# Patient Record
Sex: FEMALE | Race: WHITE | NOT HISPANIC OR LATINO | Employment: OTHER | URBAN - METROPOLITAN AREA
[De-identification: names, ages, dates, MRNs, and addresses within clinical notes are randomized per-mention and may not be internally consistent; named-entity substitution may affect disease eponyms.]

---

## 2024-03-08 ENCOUNTER — TELEPHONE (OUTPATIENT)
Dept: ORTHOPEDIC SURGERY | Facility: CLINIC | Age: 49
End: 2024-03-08
Payer: COMMERCIAL

## 2024-03-08 DIAGNOSIS — S83.401A SPRAIN OF COLLATERAL LIGAMENT OF RIGHT KNEE, INITIAL ENCOUNTER: Primary | ICD-10-CM

## 2024-03-11 RX ORDER — IBUPROFEN 800 MG/1
800 TABLET ORAL 3 TIMES DAILY
Qty: 90 TABLET | Refills: 0 | Status: SHIPPED | OUTPATIENT
Start: 2024-03-11 | End: 2024-04-10

## 2024-03-11 NOTE — TELEPHONE ENCOUNTER
.  
Quality 226: Preventive Care And Screening: Tobacco Use: Screening And Cessation Intervention: Patient screened for tobacco and is an ex-smoker
Quality 265: Biopsy Follow-Up: Biopsy results reviewed, communicated, tracked, and documented
Detail Level: Zone

## 2024-06-27 ENCOUNTER — APPOINTMENT (OUTPATIENT)
Dept: GASTROENTEROLOGY | Facility: CLINIC | Age: 49
End: 2024-06-27
Payer: COMMERCIAL

## 2024-06-27 DIAGNOSIS — Z80.0 FAMILY HISTORY OF COLON CANCER: Primary | ICD-10-CM

## 2024-06-27 PROBLEM — M70.60 TROCHANTERIC BURSITIS: Status: ACTIVE | Noted: 2024-06-27

## 2024-06-27 PROBLEM — N39.3 STRESS INCONTINENCE: Status: ACTIVE | Noted: 2021-02-26

## 2024-06-27 PROBLEM — F41.9 ANXIETY: Status: ACTIVE | Noted: 2018-10-12

## 2024-06-27 PROBLEM — F41.1 GAD (GENERALIZED ANXIETY DISORDER): Status: ACTIVE | Noted: 2023-05-17

## 2024-06-27 PROBLEM — S93.516A: Status: ACTIVE | Noted: 2023-05-17

## 2024-06-27 PROBLEM — S16.1XXA NECK STRAIN: Status: ACTIVE | Noted: 2024-06-27

## 2024-06-27 PROBLEM — J30.9 ALLERGIC RHINITIS: Status: ACTIVE | Noted: 2024-06-27

## 2024-06-27 PROBLEM — C44.310 BASAL CELL CARCINOMA (BCC) OF SKIN OF FACE: Status: ACTIVE | Noted: 2023-05-17

## 2024-06-27 PROBLEM — S99.929A PLANTAR PLATE INJURY: Status: ACTIVE | Noted: 2024-06-27

## 2024-06-27 PROBLEM — M26.609 TMJ DISEASE: Status: ACTIVE | Noted: 2024-06-27

## 2024-06-27 PROBLEM — M79.671 RIGHT FOOT PAIN: Status: ACTIVE | Noted: 2024-06-27

## 2024-06-27 PROBLEM — L73.9 FOLLICULITIS: Status: ACTIVE | Noted: 2024-06-27

## 2024-06-27 PROBLEM — R05.1 ACUTE COUGH: Status: ACTIVE | Noted: 2024-05-14

## 2024-06-27 PROBLEM — M79.672 LEFT FOOT PAIN: Status: ACTIVE | Noted: 2024-06-27

## 2024-06-27 PROBLEM — M51.24 THORACIC DISC HERNIATION: Status: ACTIVE | Noted: 2023-05-17

## 2024-06-27 PROCEDURE — 99202 OFFICE O/P NEW SF 15 MIN: CPT | Performed by: NURSE PRACTITIONER

## 2024-06-27 RX ORDER — SODIUM, POTASSIUM,MAG SULFATES 17.5-3.13G
SOLUTION, RECONSTITUTED, ORAL ORAL
Qty: 354 ML | Refills: 0 | Status: SHIPPED | OUTPATIENT
Start: 2024-06-27

## 2024-06-27 RX ORDER — IBUPROFEN 800 MG/1
800 TABLET ORAL EVERY 8 HOURS PRN
COMMUNITY
Start: 2024-03-08

## 2024-06-27 RX ORDER — DICLOFENAC POTASSIUM 50 MG/1
50 TABLET, FILM COATED ORAL AS NEEDED
COMMUNITY
Start: 2024-05-01

## 2024-06-27 NOTE — PROGRESS NOTES
"Subjective   Patient ID: Pennie Grimm is a 49 y.o. female who presents for Colon Cancer Screening.  HPI  Patient wanting to schedule a colonoscopy with Dr. Hill.  She has family history of colon cancer.  Her paternal grandmother has history of colon cancer.  Her father has history of precancerous polyps.  Patient denies melena, hematochezia or hematemesis.  No bowel habit changes or change in stool caliber.  No abdominal pain, weight loss or decreased appetite.  No GERD, nausea or vomiting.  No dysphagia.  No excessive NSAIDs, alcohol.  No smoking.  ->Colonoscopy 11/2019:  - Diverticulosis in the sigmoid colon.                         - The examination was otherwise normal.                         - No specimens collected.                         - Repeat colonoscopy in 5 years for surveillance.  Current Outpatient Medications on File Prior to Visit   Medication Sig Dispense Refill    diclofenac (Cataflam) 50 mg tablet Take 1 tablet (50 mg) by mouth if needed.      ibuprofen 800 mg tablet Take 1 tablet (800 mg) by mouth every 8 hours if needed.      levonorgestrel (Mirena) 21 mcg/24 hr (8 yrs) 52 mg IUD 52 mg by intrauterine route.       No current facility-administered medications on file prior to visit.        Review of Systems   All other systems reviewed and are negative.      Objective   Physical Exam  Neurological:      Mental Status: She is oriented to person, place, and time.      Comments: Patient is conversive and answering questions appropriately.  Telephone encounter 15 minutes, to review the chart, talk to the patient and document.       There were no vitals taken for this visit.     No results found for: \"WBC\", \"HGB\", \"HCT\", \"MCV\", \"PLT\"        No lab exists for component: \"LABALBU\"    No results found for: \"AFP\"  No results found for: \"TSH\"      Assessment/Plan   Diagnoses and all orders for this visit:  Family history of colon cancer  Very pleasant 49-year-old female establishing with GI at  " for surveillance colonoscopy with Dr. Hill.  Her last colonoscopy was in November, 2019 detailed in the HPI.  Patient's paternal grandmother has a history of colon cancer and her father has history of precancerous polyps.  It was recommended by Dr. Hill that she repeat her colonoscopy in 5 years for surveillance.  No red flag symptoms noted.  The procedure was discussed at length, including all possible risks.  Importance of the bowel prep was stressed to the patient for optimal results.  -     Colonoscopy Screening; High Risk Patient; Future  -     sodium,potassium,mag sulfates (Suprep) 17.5-3.13-1.6 gram recon soln solution; Take one bottle twice as directed by the prep instructions

## 2024-11-05 NOTE — PROGRESS NOTES
History: Pennie is here for her right knee and left elbow. She is an established patient last seen in 2023 for her right hip. She recently returned from her vacation in [facility]. She describes an occasional burning pain in her knee for the past 9 months. She feels that it bothers her more with activity. She notes that working out does not seem to make her knee hurt worse. Her elbow has been painful for the past month. It is aggravated with golf, tennis, and free weights. She denies any injury.       Past medical history: Multiple  Medications: Multiple  Allergies: No known drug allergies    Please refer to the intake H&P regarding the patient's review of systems, family history and social history as was done today    HEENT: Normal  Lungs: Clear to auscultation  Heart: RRR  Abdomen: Soft, nontender  Skin: clear  Extremity: She has some mild tenderness around the lateral epicondyle of the left elbow.  There is also pain with wrist extension maneuvers.  Otherwise full range of motion and no numbness.  Knee exam shows minimal tenderness today but pain occurs around the anterior and lateral aspect of the kneecap.  1+ crepitus with range of motion.  Positive posterior James's and over.  No numbness.  Contralateral exam is normal for strength, motion, stability and neurovascular assessment.    Radiographs: AP lateral notch sunrise views of the right knee shows mild degenerative change only with neutral alignment.    Assessment: Left tennis elbow, Right knee patellofemoral syndrome cannot rule out further internal derangement    Plan: She has some occasional burning pain in her right knee. She has minimal arthritis in this knee. Her elbow has been painful for the past month. We discussed several modalities for treatment of both problems.  I will order a MRI of right knee today.  We can consider therapy for both areas including VMO strengthening and tennis elbow protocol if needed.  She can also use ibuprofen if  being active. We will get braces for her knee and elbow today. She will follow-up with me as needed and we can call her with MRI results if preferred..   All questions were answered today with the patient.      Scribe Attestation  By signing my name below, IYennifer Scribe   attest that this documentation has been prepared under the direction and in the presence of Larry Weaver MD.

## 2024-11-06 ENCOUNTER — OFFICE VISIT (OUTPATIENT)
Dept: ORTHOPEDIC SURGERY | Facility: CLINIC | Age: 49
End: 2024-11-06
Payer: COMMERCIAL

## 2024-11-06 ENCOUNTER — HOSPITAL ENCOUNTER (OUTPATIENT)
Dept: RADIOLOGY | Facility: CLINIC | Age: 49
Discharge: HOME | End: 2024-11-06
Payer: COMMERCIAL

## 2024-11-06 DIAGNOSIS — M25.561 RIGHT KNEE PAIN, UNSPECIFIED CHRONICITY: ICD-10-CM

## 2024-11-06 DIAGNOSIS — M22.2X1 RIGHT PATELLOFEMORAL SYNDROME: ICD-10-CM

## 2024-11-06 DIAGNOSIS — S83.401A SPRAIN OF COLLATERAL LIGAMENT OF RIGHT KNEE, INITIAL ENCOUNTER: ICD-10-CM

## 2024-11-06 DIAGNOSIS — M77.12 LEFT TENNIS ELBOW: Primary | ICD-10-CM

## 2024-11-06 PROCEDURE — 73564 X-RAY EXAM KNEE 4 OR MORE: CPT | Mod: RT

## 2024-11-06 PROCEDURE — 73564 X-RAY EXAM KNEE 4 OR MORE: CPT | Mod: RIGHT SIDE | Performed by: ORTHOPAEDIC SURGERY

## 2024-11-06 PROCEDURE — 99211 OFF/OP EST MAY X REQ PHY/QHP: CPT | Performed by: ORTHOPAEDIC SURGERY

## 2024-11-08 ENCOUNTER — HOSPITAL ENCOUNTER (OUTPATIENT)
Dept: RADIOLOGY | Facility: CLINIC | Age: 49
Discharge: HOME | End: 2024-11-08
Payer: COMMERCIAL

## 2024-11-08 DIAGNOSIS — S83.401A SPRAIN OF COLLATERAL LIGAMENT OF RIGHT KNEE, INITIAL ENCOUNTER: ICD-10-CM

## 2024-11-08 DIAGNOSIS — M25.561 RIGHT KNEE PAIN, UNSPECIFIED CHRONICITY: ICD-10-CM

## 2024-11-08 PROCEDURE — 73721 MRI JNT OF LWR EXTRE W/O DYE: CPT | Mod: RT

## 2024-11-25 ENCOUNTER — APPOINTMENT (OUTPATIENT)
Dept: GASTROENTEROLOGY | Facility: EXTERNAL LOCATION | Age: 49
End: 2024-11-25
Payer: COMMERCIAL

## 2024-12-04 ENCOUNTER — ANESTHESIA EVENT (OUTPATIENT)
Dept: GASTROENTEROLOGY | Facility: EXTERNAL LOCATION | Age: 49
End: 2024-12-04

## 2024-12-10 ENCOUNTER — APPOINTMENT (OUTPATIENT)
Dept: GASTROENTEROLOGY | Facility: EXTERNAL LOCATION | Age: 49
End: 2024-12-10
Payer: COMMERCIAL

## 2024-12-10 ENCOUNTER — ANESTHESIA (OUTPATIENT)
Dept: GASTROENTEROLOGY | Facility: EXTERNAL LOCATION | Age: 49
End: 2024-12-10

## 2024-12-10 VITALS
BODY MASS INDEX: 21.66 KG/M2 | HEART RATE: 61 BPM | TEMPERATURE: 97.5 F | SYSTOLIC BLOOD PRESSURE: 109 MMHG | DIASTOLIC BLOOD PRESSURE: 83 MMHG | HEIGHT: 67 IN | OXYGEN SATURATION: 100 % | WEIGHT: 138 LBS | RESPIRATION RATE: 13 BRPM

## 2024-12-10 DIAGNOSIS — Z80.0 FAMILY HISTORY OF COLON CANCER: Primary | ICD-10-CM

## 2024-12-10 PROCEDURE — 45380 COLONOSCOPY AND BIOPSY: CPT | Performed by: INTERNAL MEDICINE

## 2024-12-10 RX ORDER — PROPOFOL 10 MG/ML
INJECTION, EMULSION INTRAVENOUS AS NEEDED
Status: DISCONTINUED | OUTPATIENT
Start: 2024-12-10 | End: 2024-12-10

## 2024-12-10 RX ORDER — SODIUM CHLORIDE 9 MG/ML
INJECTION, SOLUTION INTRAVENOUS CONTINUOUS PRN
Status: DISCONTINUED | OUTPATIENT
Start: 2024-12-10 | End: 2024-12-10

## 2024-12-10 SDOH — HEALTH STABILITY: MENTAL HEALTH: CURRENT SMOKER: 0

## 2024-12-10 ASSESSMENT — PAIN SCALES - GENERAL
PAINLEVEL_OUTOF10: 0 - NO PAIN
PAIN_LEVEL: 0
PAINLEVEL_OUTOF10: 0 - NO PAIN

## 2024-12-10 ASSESSMENT — PAIN - FUNCTIONAL ASSESSMENT
PAIN_FUNCTIONAL_ASSESSMENT: 0-10

## 2024-12-10 ASSESSMENT — COLUMBIA-SUICIDE SEVERITY RATING SCALE - C-SSRS
6. HAVE YOU EVER DONE ANYTHING, STARTED TO DO ANYTHING, OR PREPARED TO DO ANYTHING TO END YOUR LIFE?: NO
1. IN THE PAST MONTH, HAVE YOU WISHED YOU WERE DEAD OR WISHED YOU COULD GO TO SLEEP AND NOT WAKE UP?: NO
6. HAVE YOU EVER DONE ANYTHING, STARTED TO DO ANYTHING, OR PREPARED TO DO ANYTHING TO END YOUR LIFE?: NO
2. HAVE YOU ACTUALLY HAD ANY THOUGHTS OF KILLING YOURSELF?: NO
1. IN THE PAST MONTH, HAVE YOU WISHED YOU WERE DEAD OR WISHED YOU COULD GO TO SLEEP AND NOT WAKE UP?: NO
2. HAVE YOU ACTUALLY HAD ANY THOUGHTS OF KILLING YOURSELF?: NO

## 2024-12-10 NOTE — DISCHARGE INSTRUCTIONS

## 2024-12-10 NOTE — ANESTHESIA PREPROCEDURE EVALUATION
Patient: Pennie Grimm    Procedure Information       Date/Time: 12/10/24 0830    Scheduled providers: Cody Hill MD; Carmen Lechuga RN    Procedure: COLONOSCOPY    Location: [facility]            Relevant Problems   Neuro   (+) Anxiety   (+) ELMER (generalized anxiety disorder)      Musculoskeletal   (+) Degeneration of lumbar or lumbosacral intervertebral disc   (+) Displacement of lumbar intervertebral disc without myelopathy      Skin   (+) Basal cell carcinoma (BCC) of skin of face   (+) Basal cell carcinoma of head       Clinical information reviewed:   Tobacco  Allergies  Meds  Problems  Med Hx  Surg Hx  OB Status    Fam Hx  Soc Hx        NPO Detail:  NPO/Void Status  Date of Last Liquid: 12/10/24  Time of Last Liquid: 0300  Date of Last Solid: 12/08/24  Time of Last Solid: 1800  Last Intake Type: Clear fluids         Physical Exam    Airway  Mallampati: II  TM distance: >3 FB  Neck ROM: full     Cardiovascular   Rhythm: regular  Rate: normal     Dental    Pulmonary   Breath sounds clear to auscultation     Abdominal   Abdomen: soft  Bowel sounds: normal         Vitals:    12/10/24 0832   BP: 104/64   Pulse: 64   Resp: 13   Temp: 36.8 °C (98.2 °F)   SpO2: 96%       Past Surgical History:   Procedure Laterality Date    INCONTINENCE SURGERY      OTHER SURGICAL HISTORY  01/03/2022    No history of surgery    TOE SURGERY Left      Past Medical History:   Diagnosis Date    Cervicalgia 11/24/2014    Neck pain    Unspecified asthma, uncomplicated ([facility]) 04/30/2015    AB (asthmatic bronchitis)       Current Outpatient Medications:     diclofenac (Cataflam) 50 mg tablet, Take 1 tablet (50 mg) by mouth if needed., Disp: , Rfl:     ibuprofen 800 mg tablet, Take 1 tablet (800 mg) by mouth every 8 hours if needed., Disp: , Rfl:     levonorgestrel (Mirena) 21 mcg/24 hr (8 yrs) 52 mg IUD, 52 mg by intrauterine route., Disp: , Rfl:   Prior to Admission medications    Medication Sig Start  "Date End Date Taking? Authorizing Provider   diclofenac (Cataflam) 50 mg tablet Take 1 tablet (50 mg) by mouth if needed. 5/1/24  Yes Historical Provider, MD   ibuprofen 800 mg tablet Take 1 tablet (800 mg) by mouth every 8 hours if needed. 3/8/24  Yes Historical Provider, MD   levonorgestrel (Mirena) 21 mcg/24 hr (8 yrs) 52 mg IUD 52 mg by intrauterine route.   Yes Historical Provider, MD   sodium,potassium,mag sulfates (Suprep) 17.5-3.13-1.6 gram recon soln solution Take one bottle twice as directed by the prep instructions 6/27/24 12/10/24 Yes SHARRI Joiner-CNP     No Known Allergies  Social History     Tobacco Use    Smoking status: Never    Smokeless tobacco: Not on file   Substance Use Topics    Alcohol use: Yes     Comment: socially         Chemistry    No results found for: \"NA\", \"K\", \"CL\", \"CO2\", \"BUN\", \"CREATININE\", \"GLU\" No results found for: \"CALCIUM\", \"ALKPHOS\", \"AST\", \"ALT\", \"BILITOT\"       No results found for: \"WBC\", \"HGB\", \"HCT\", \"PLT\"  No results found for: \"PROTIME\", \"PTT\", \"INR\"  No results found for this or any previous visit (from the past 4464 hours).      Anesthesia Plan    History of general anesthesia?: yes  History of complications of general anesthesia?: no    ASA 1     MAC     The patient is not a current smoker.  Patient was not previously instructed to abstain from smoking on day of procedure.  Education provided regarding risk of obstructive sleep apnea.  intravenous induction   Anesthetic plan and risks discussed with patient.    Plan discussed with CRNA.      "

## 2024-12-10 NOTE — ANESTHESIA POSTPROCEDURE EVALUATION
Patient: Pennie Grimm    Procedure Summary       Date: 12/10/24 Room / Location: [facility]    Anesthesia Start: 0848 Anesthesia Stop: 0914    Procedure: COLONOSCOPY Diagnosis:       Family history of colon cancer      Family history of colon cancer    Scheduled Providers: Cody Hill MD; Carmen Lechuga RN Responsible Provider: ALEX Mckinney    Anesthesia Type: MAC ASA Status: 1            Anesthesia Type: MAC    Vitals Value Taken Time   BP 84/46 12/10/24 0913   Temp 36.4 °C (97.5 °F) 12/10/24 0913   Pulse 68 12/10/24 0913   Resp 17 12/10/24 0913   SpO2 96 12/10/24 0916       Anesthesia Post Evaluation    Patient location during evaluation: PACU  Patient participation: complete - patient participated  Level of consciousness: sleepy but conscious  Pain score: 0  Pain management: adequate  Airway patency: patent  Cardiovascular status: acceptable  Respiratory status: acceptable  Hydration status: acceptable  Postoperative Nausea and Vomiting: none        No notable events documented.

## 2024-12-10 NOTE — H&P
Outpatient NR Procedure H&P    Patient Profile  Name Pennie Grimm  Date of Birth 1975  MRN [id number]  PCP Maximilian Cameron        Diagnosis: screening colonoscopy  Procedure(s):  Colonoscopy       Allergies  No Known Allergies    Past Medical History   Past Medical History:   Diagnosis Date    Cervicalgia 11/24/2014    Neck pain    Unspecified asthma, uncomplicated ([facility]) 04/30/2015    AB (asthmatic bronchitis)       Medication Reviewed - yes  Prior to Admission medications    Medication Sig Start Date End Date Taking? Authorizing Provider   diclofenac (Cataflam) 50 mg tablet Take 1 tablet (50 mg) by mouth if needed. 5/1/24  Yes Historical Provider, MD   ibuprofen 800 mg tablet Take 1 tablet (800 mg) by mouth every 8 hours if needed. 3/8/24  Yes Historical Provider, MD   levonorgestrel (Mirena) 21 mcg/24 hr (8 yrs) 52 mg IUD 52 mg by intrauterine route.   Yes Historical Provider, MD   sodium,potassium,mag sulfates (Suprep) 17.5-3.13-1.6 gram recon soln solution Take one bottle twice as directed by the prep instructions 6/27/24 12/10/24 Yes SHARRI Joiner-CNP       Physical Exam  Vitals:    12/10/24 0832   BP: 104/64   Pulse: 64   Resp: 13   Temp: 36.8 °C (98.2 °F)   SpO2: 96%      Weight   Vitals:    12/10/24 0832   Weight: 62.6 kg (138 lb)     BMI Body mass index is 21.94 kg/m².    General: A&Ox3, NAD.  HEENT: AT/NC.   CV: RRR. No murmur.  Resp: CTA bilaterally. No wheezing, rhonchi or rales.   GI: Soft, NT/ND. BSx4.  Extrem: No edema. Pulses intact.  Skin: No Jaundice.   Neuro: No focal deficits.   Psych: Normal mood and affect.        Oropharyngeal Classification I (soft palate, uvula, fauces, and tonsillar pillars visible)  ASA PS Classification 2  Sedation Plan: Deep Sedation.  Procedure Plan - pre-procedural (re)assesment completed by physician:  discharge/transfer patient when discharge criteria met    Cody Hill MD  12/10/2024 8:36 AM

## 2024-12-11 ASSESSMENT — PAIN SCALES - GENERAL: PAINLEVEL_OUTOF10: 0 - NO PAIN

## 2024-12-18 LAB
LABORATORY COMMENT REPORT: NORMAL
PATH REPORT.FINAL DX SPEC: NORMAL
PATH REPORT.GROSS SPEC: NORMAL
PATH REPORT.MICROSCOPIC SPEC OTHER STN: NORMAL
PATH REPORT.RELEVANT HX SPEC: NORMAL
PATH REPORT.TOTAL CANCER: NORMAL

## 2024-12-19 ENCOUNTER — TELEPHONE (OUTPATIENT)
Dept: GASTROENTEROLOGY | Facility: EXTERNAL LOCATION | Age: 49
End: 2024-12-19
Payer: COMMERCIAL